# Patient Record
Sex: MALE | Race: BLACK OR AFRICAN AMERICAN | NOT HISPANIC OR LATINO | Employment: OTHER | URBAN - METROPOLITAN AREA
[De-identification: names, ages, dates, MRNs, and addresses within clinical notes are randomized per-mention and may not be internally consistent; named-entity substitution may affect disease eponyms.]

---

## 2017-11-01 ENCOUNTER — TELEPHONE (OUTPATIENT)
Dept: NEPHROLOGY | Facility: CLINIC | Age: 72
End: 2017-11-01

## 2017-11-01 NOTE — TELEPHONE ENCOUNTER
----- Message from Caren Christiansen sent at 11/1/2017 11:05 AM CDT -----  Contact: WifeSantos De La TorreOgzqtlg-[phone]  Pt would  Like to consult with the nurse about an appt.  Please call back at [phone].  [facility] [place]-

## 2018-01-10 ENCOUNTER — DOCUMENTATION ONLY (OUTPATIENT)
Dept: NEPHROLOGY | Facility: CLINIC | Age: 73
End: 2018-01-10

## 2018-01-10 ENCOUNTER — TELEPHONE (OUTPATIENT)
Dept: NEPHROLOGY | Facility: CLINIC | Age: 73
End: 2018-01-10

## 2018-01-10 NOTE — PROGRESS NOTES
Discussed with pt on the phone , he says he is not happy with current nephrologist and wants to switch to our care.  To my understanding he is dialyzing at [facility].  Advised patient to discuss with the  at the dialysis unit to request a change.    Andres Hernandez MD

## 2018-01-10 NOTE — TELEPHONE ENCOUNTER
Incoming call from patient who states that you saw him at the ER and really helped his health improve. He wants to take to you about switching under your care at the dialysis units. Advised patient to consult with his . He still request a call back. Please advise.  Call back number [phone] or [phone]